# Patient Record
Sex: MALE | Race: WHITE | NOT HISPANIC OR LATINO | Employment: UNEMPLOYED | URBAN - NONMETROPOLITAN AREA
[De-identification: names, ages, dates, MRNs, and addresses within clinical notes are randomized per-mention and may not be internally consistent; named-entity substitution may affect disease eponyms.]

---
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
## 2023-07-15 ENCOUNTER — HOSPITAL ENCOUNTER (EMERGENCY)
Facility: HOSPITAL | Age: 18
Discharge: HOME | End: 2023-07-15
Attending: STUDENT IN AN ORGANIZED HEALTH CARE EDUCATION/TRAINING PROGRAM | Admitting: STUDENT IN AN ORGANIZED HEALTH CARE EDUCATION/TRAINING PROGRAM
Payer: COMMERCIAL

## 2023-07-15 VITALS
DIASTOLIC BLOOD PRESSURE: 90 MMHG | OXYGEN SATURATION: 97 % | RESPIRATION RATE: 18 BRPM | TEMPERATURE: 96.1 F | SYSTOLIC BLOOD PRESSURE: 107 MMHG | HEART RATE: 100 BPM

## 2023-07-15 DIAGNOSIS — Z98.818 STATUS POST WISDOM TOOTH EXTRACTION: ICD-10-CM

## 2023-07-15 DIAGNOSIS — R11.2 NAUSEA AND VOMITING, UNSPECIFIED VOMITING TYPE: ICD-10-CM

## 2023-07-15 DIAGNOSIS — R51.9 NONINTRACTABLE HEADACHE, UNSPECIFIED CHRONICITY PATTERN, UNSPECIFIED HEADACHE TYPE: ICD-10-CM

## 2023-07-15 LAB
ALBUMIN SERPL BCG-MCNC: 4.7 G/DL (ref 3.5–5.2)
ALP SERPL-CCNC: 112 U/L (ref 40–129)
ALT SERPL W P-5'-P-CCNC: 27 U/L (ref 0–70)
ANION GAP SERPL CALCULATED.3IONS-SCNC: 20 MMOL/L (ref 7–15)
AST SERPL W P-5'-P-CCNC: 25 U/L (ref 0–45)
BILIRUB DIRECT SERPL-MCNC: <0.2 MG/DL (ref 0–0.3)
BILIRUB SERPL-MCNC: 0.8 MG/DL
BUN SERPL-MCNC: 8.6 MG/DL (ref 6–20)
CALCIUM SERPL-MCNC: 10.3 MG/DL (ref 8.6–10)
CHLORIDE SERPL-SCNC: 98 MMOL/L (ref 98–107)
CREAT SERPL-MCNC: 0.64 MG/DL (ref 0.67–1.17)
DEPRECATED HCO3 PLAS-SCNC: 19 MMOL/L (ref 22–29)
ERYTHROCYTE [DISTWIDTH] IN BLOOD BY AUTOMATED COUNT: 11.9 % (ref 10–15)
GFR SERPL CREATININE-BSD FRML MDRD: >90 ML/MIN/1.73M2
GLUCOSE SERPL-MCNC: 97 MG/DL (ref 70–99)
HCT VFR BLD AUTO: 43.1 % (ref 40–53)
HGB BLD-MCNC: 15.2 G/DL (ref 13.3–17.7)
HOLD SPECIMEN: NORMAL
LIPASE SERPL-CCNC: 12 U/L (ref 13–60)
MCH RBC QN AUTO: 29.8 PG (ref 26.5–33)
MCHC RBC AUTO-ENTMCNC: 35.3 G/DL (ref 31.5–36.5)
MCV RBC AUTO: 85 FL (ref 78–100)
PLATELET # BLD AUTO: 308 10E3/UL (ref 150–450)
POTASSIUM SERPL-SCNC: 4.1 MMOL/L (ref 3.4–5.3)
PROT SERPL-MCNC: 7.8 G/DL (ref 6.4–8.3)
RBC # BLD AUTO: 5.1 10E6/UL (ref 4.4–5.9)
SODIUM SERPL-SCNC: 137 MMOL/L (ref 136–145)
WBC # BLD AUTO: 13.3 10E3/UL (ref 4–11)

## 2023-07-15 PROCEDURE — 250N000011 HC RX IP 250 OP 636: Performed by: STUDENT IN AN ORGANIZED HEALTH CARE EDUCATION/TRAINING PROGRAM

## 2023-07-15 PROCEDURE — 93005 ELECTROCARDIOGRAM TRACING: CPT

## 2023-07-15 PROCEDURE — 93010 ELECTROCARDIOGRAM REPORT: CPT | Performed by: INTERNAL MEDICINE

## 2023-07-15 PROCEDURE — 96375 TX/PRO/DX INJ NEW DRUG ADDON: CPT

## 2023-07-15 PROCEDURE — 99284 EMERGENCY DEPT VISIT MOD MDM: CPT | Performed by: STUDENT IN AN ORGANIZED HEALTH CARE EDUCATION/TRAINING PROGRAM

## 2023-07-15 PROCEDURE — 250N000013 HC RX MED GY IP 250 OP 250 PS 637: Performed by: STUDENT IN AN ORGANIZED HEALTH CARE EDUCATION/TRAINING PROGRAM

## 2023-07-15 PROCEDURE — 83690 ASSAY OF LIPASE: CPT | Performed by: STUDENT IN AN ORGANIZED HEALTH CARE EDUCATION/TRAINING PROGRAM

## 2023-07-15 PROCEDURE — 99284 EMERGENCY DEPT VISIT MOD MDM: CPT | Mod: 25

## 2023-07-15 PROCEDURE — 82248 BILIRUBIN DIRECT: CPT | Performed by: STUDENT IN AN ORGANIZED HEALTH CARE EDUCATION/TRAINING PROGRAM

## 2023-07-15 PROCEDURE — 80053 COMPREHEN METABOLIC PANEL: CPT | Performed by: STUDENT IN AN ORGANIZED HEALTH CARE EDUCATION/TRAINING PROGRAM

## 2023-07-15 PROCEDURE — 36415 COLL VENOUS BLD VENIPUNCTURE: CPT | Performed by: STUDENT IN AN ORGANIZED HEALTH CARE EDUCATION/TRAINING PROGRAM

## 2023-07-15 PROCEDURE — 82310 ASSAY OF CALCIUM: CPT | Performed by: STUDENT IN AN ORGANIZED HEALTH CARE EDUCATION/TRAINING PROGRAM

## 2023-07-15 PROCEDURE — 85027 COMPLETE CBC AUTOMATED: CPT | Performed by: STUDENT IN AN ORGANIZED HEALTH CARE EDUCATION/TRAINING PROGRAM

## 2023-07-15 PROCEDURE — 258N000003 HC RX IP 258 OP 636: Performed by: STUDENT IN AN ORGANIZED HEALTH CARE EDUCATION/TRAINING PROGRAM

## 2023-07-15 PROCEDURE — 96361 HYDRATE IV INFUSION ADD-ON: CPT

## 2023-07-15 PROCEDURE — 96374 THER/PROPH/DIAG INJ IV PUSH: CPT

## 2023-07-15 RX ORDER — ONDANSETRON 4 MG/1
4 TABLET, ORALLY DISINTEGRATING ORAL ONCE
Status: COMPLETED | OUTPATIENT
Start: 2023-07-15 | End: 2023-07-15

## 2023-07-15 RX ORDER — KETOROLAC TROMETHAMINE 15 MG/ML
15 INJECTION, SOLUTION INTRAMUSCULAR; INTRAVENOUS ONCE
Status: COMPLETED | OUTPATIENT
Start: 2023-07-15 | End: 2023-07-15

## 2023-07-15 RX ORDER — DROPERIDOL 2.5 MG/ML
0.62 INJECTION, SOLUTION INTRAMUSCULAR; INTRAVENOUS ONCE
Status: COMPLETED | OUTPATIENT
Start: 2023-07-15 | End: 2023-07-15

## 2023-07-15 RX ORDER — ACETAMINOPHEN 325 MG/1
975 TABLET ORAL ONCE
Status: COMPLETED | OUTPATIENT
Start: 2023-07-15 | End: 2023-07-15

## 2023-07-15 RX ADMIN — KETOROLAC TROMETHAMINE 15 MG: 15 INJECTION, SOLUTION INTRAMUSCULAR; INTRAVENOUS at 18:40

## 2023-07-15 RX ADMIN — ONDANSETRON 4 MG: 4 TABLET, ORALLY DISINTEGRATING ORAL at 16:31

## 2023-07-15 RX ADMIN — ACETAMINOPHEN 975 MG: 325 TABLET, FILM COATED ORAL at 18:39

## 2023-07-15 RX ADMIN — DROPERIDOL 0.62 MG: 2.5 INJECTION, SOLUTION INTRAMUSCULAR; INTRAVENOUS at 19:22

## 2023-07-15 RX ADMIN — SODIUM CHLORIDE 1000 ML: 9 INJECTION, SOLUTION INTRAVENOUS at 18:40

## 2023-07-15 ASSESSMENT — ACTIVITIES OF DAILY LIVING (ADL): ADLS_ACUITY_SCORE: 35

## 2023-07-15 NOTE — ED TRIAGE NOTES
Had headache and vomiting.  Started at 0900 this morning.   Tylenol taken . Mercedes teeth taken out Monday.  Vomiting started at one.

## 2023-07-16 NOTE — ED PROVIDER NOTES
History     Chief Complaint   Patient presents with     Headache     Nausea & Vomiting     HPI  Marco A Vazquez is a 18 year old male status post recent wisdom teeth extraction presenting for concerns regarding headache and vomiting.  Mother reports that he has a history of migraines, but this 1 feels more severe.  His symptoms have been ongoing since yesterday.    He reported a headache, and it was mild, but today, he feels like it was more severe.  Mother is concerned that he may have a dry socket.  He denies any pain in his mouth, or any trouble opening his mouth.  He denies any recent fevers or chills.    Reports that he has vomited close to 30 times today, which is atypical for him, prompting this concern, and precipitating his visit.  He denies any neck stiffness, or sensitivity to light.  No recent fevers or rashes.  He denies any abdominal pain.    Allergies:  Allergies   Allergen Reactions     Citalopram      Worsened anxiety     Problem List:    Patient Active Problem List    Diagnosis Date Noted     Influenza A 12/28/2013     Priority: Medium      Past Medical History:    No past medical history on file.    Past Surgical History:    No past surgical history on file.    Family History:    No family history on file.    Social History:  Marital Status:  Single [1]  Social History     Tobacco Use     Smoking status: Never      Medications:    hydrOXYzine (ATARAX) 25 MG tablet  Omeprazole (PRILOSEC PO)    Review of Systems   Performed, see HPI for pertinent positives and negatives.    Physical Exam   BP: 135/75  Pulse: 89  Temp: (!) 96.1  F (35.6  C)  Resp: 16  SpO2: 100 %    Physical Exam   Constitutional: Alert, and appropriate.  Smiles upon entering the room.  Head: No trauma.  Eyes: Pupils are 6 mm bilaterally.  They are reactive.  ENT: The wisdom teeth socket are clean, dry, and intact.  Appropriate postsurgical change.  No bleeding.  No evidence of fluctuance, or swelling.  Dentition is otherwise  normal.  Oral normal posterior oropharynx.  No trismus.  Neck: No palpable masses.  No nuchal rigidity.  Cardiovascular: Regular rate and rhythm.  Pulmonary: Unlabored respirations.  Clear to auscultation bilaterally.  Abdomen: Soft, nontender.  Extremities: No peripheral edema.  Skin: Warm, dry, without diaphoresis.  Neuro: Alert and appropriate.  Grossly nonfocal, moving all 4 extremities spontaneously and equally.  Follows commands.  Normal sensorium.    ED Course        ED Course as of 07/15/23 2006   Sat Jul 15, 2023   1907 Reevaluated the patient, and he feels improved.  He reports that the pain is approximately a 2 out of 10.  No evidence of hepatocellular injury, and leukocytosis is mild.  We will plan for discharge.  Discussed with the patient this plan and he was agreeable.  Advised acetaminophen and ibuprofen for further pain control.  Return precautions reviewed in detail.  Patient's mother understood and agreed with this plan.  All question and concerns otherwise addressed.   1911 The patient will be offered droperidol, as it was not given, due to pharmacy concerns, prior to discharge.     Results for orders placed or performed during the hospital encounter of 07/15/23 (from the past 24 hour(s))   Basic metabolic panel   Result Value Ref Range    Sodium 137 136 - 145 mmol/L    Potassium 4.1 3.4 - 5.3 mmol/L    Chloride 98 98 - 107 mmol/L    Carbon Dioxide (CO2) 19 (L) 22 - 29 mmol/L    Anion Gap 20 (H) 7 - 15 mmol/L    Urea Nitrogen 8.6 6.0 - 20.0 mg/dL    Creatinine 0.64 (L) 0.67 - 1.17 mg/dL    Calcium 10.3 (H) 8.6 - 10.0 mg/dL    Glucose 97 70 - 99 mg/dL    GFR Estimate >90 >60 mL/min/1.73m2   Hepatic panel   Result Value Ref Range    Protein Total 7.8 6.4 - 8.3 g/dL    Albumin 4.7 3.5 - 5.2 g/dL    Bilirubin Total 0.8 <=1.2 mg/dL    Alkaline Phosphatase 112 40 - 129 U/L    AST 25 0 - 45 U/L    ALT 27 0 - 70 U/L    Bilirubin Direct <0.20 0.00 - 0.30 mg/dL   Lipase   Result Value Ref Range    Lipase  12 (L) 13 - 60 U/L   CBC with platelets   Result Value Ref Range    WBC Count 13.3 (H) 4.0 - 11.0 10e3/uL    RBC Count 5.10 4.40 - 5.90 10e6/uL    Hemoglobin 15.2 13.3 - 17.7 g/dL    Hematocrit 43.1 40.0 - 53.0 %    MCV 85 78 - 100 fL    MCH 29.8 26.5 - 33.0 pg    MCHC 35.3 31.5 - 36.5 g/dL    RDW 11.9 10.0 - 15.0 %    Platelet Count 308 150 - 450 10e3/uL   [facility] Draw    Narrative    The following orders were created for panel order [facility] Draw.  Procedure                               Abnormality         Status                     ---------                               -----------         ------                     Extra Blue Top Tube[[id number]]                              In process                 Extra Red Top Tube[[id number]]                               In process                 Extra Green Top (Lithium...[[id number]]                                                 Extra Green Top (Lithium...[[id number]]                      In process                 Extra Purple Top Tube[[id number]]                                                         Please view results for these tests on the individual orders.     Medications   droperidol (INAPSINE) injection 0.625 mg (has no administration in time range)   0.9% sodium chloride BOLUS (1,000 mLs Intravenous $New Bag 7/15/23 1840)   ondansetron (ZOFRAN ODT) ODT tab 4 mg (4 mg Oral $Given 7/15/23 1631)   acetaminophen (TYLENOL) tablet 975 mg (975 mg Oral $Given 7/15/23 1839)   ketorolac (TORADOL) injection 15 mg (15 mg Intravenous $Given 7/15/23 1840)     Assessments & Plan (with Medical Decision Making)     I have reviewed the nursing notes.    I have reviewed the findings, diagnosis, plan and need for follow up with the patient.  Medical Decision Making  The patient's presentation was of low complexity (2+ clearly self-limited or minor problems).    The patient's evaluation involved:  history and exam without other MDM data elements    The patient's management  necessitated moderate risk (prescription drug management including medications given in the ED).    In summary, this is a 18-year-old male who is presenting for concerns regarding headache and vomiting.  He has a self-reported history of migraines.  His vitals are reassuring at the time of initial evaluation.    I do not suspect meningitis or subarachnoid hemorrhage.  I do not suspect a postoperative complication related to the patient's was him teeth extractions.  His exam is reassuring.  I do not suspect an intra-abdominal pathology, but given the numerous amounts of vomiting, will plan for liver enzymes and lipase.  Mother reports giving acetaminophen for his headaches at home, with the appropriate dosing.    I do not suspect acetaminophen toxicity.  We will plan for further evaluation with laboratory work, and migraine cocktail.  Acetaminophen, Toradol, liter bolus, and droperidol ordered.  Discussed with patient's mother this plan and she was agreeable.  All questions and concerns otherwise addressed and answered.    New Prescriptions    No medications on file       Final diagnoses:   Nonintractable headache, unspecified chronicity pattern, unspecified headache type   Nausea and vomiting, unspecified vomiting type   Status post wisdom tooth extraction       7/15/2023   [facility]     Gatito Brewster MD  07/15/23 1915       Gatito Brewster MD  07/15/23 2006

## 2023-07-16 NOTE — ED NOTES
Writer notified provider of recommended 2-3 monitoring period with droperidol. Provider stated that he is fine with 1 hr of tele and observation.

## 2023-07-16 NOTE — DISCHARGE INSTRUCTIONS
It was a pleasure taking care of you today. We saw you for headache and vomiting. We recommend that you take the medicine and ibuprofen for your symptoms. You may take 1 gram of acetaminophen every 6 hours. Do not exceed 4 grams in 24 hours. If you continue to have pain, you may want to take ibuprofen. You may take 600 milligrams every 6 hours. It may cause an upset stomach. Take it with food to help prevent this side effect.     Please follow up with your primary care provider in 1 week for a recheck. Please return to the emergency department if you develop symptoms like worsening pain, nausea, vomiting, fever, rash, stiffness, or if your symptoms persist or get worse. Take care. Feel better.